# Patient Record
Sex: FEMALE | Race: AMERICAN INDIAN OR ALASKA NATIVE | URBAN - NONMETROPOLITAN AREA
[De-identification: names, ages, dates, MRNs, and addresses within clinical notes are randomized per-mention and may not be internally consistent; named-entity substitution may affect disease eponyms.]

---

## 2017-02-07 ENCOUNTER — NEW PATIENT (OUTPATIENT)
Dept: URBAN - NONMETROPOLITAN AREA CLINIC 6 | Facility: CLINIC | Age: 82
End: 2017-02-07
Payer: MEDICARE

## 2017-02-07 DIAGNOSIS — H25.12 AGE-RELATED NUCLEAR CATARACT, LEFT EYE: Primary | ICD-10-CM

## 2017-02-07 PROCEDURE — 92004 COMPRE OPH EXAM NEW PT 1/>: CPT | Performed by: OPTOMETRIST

## 2017-02-07 ASSESSMENT — INTRAOCULAR PRESSURE
OD: 14
OS: 14

## 2017-02-07 ASSESSMENT — VISUAL ACUITY
OD: 20/30
OS: 20/70

## 2017-02-07 ASSESSMENT — KERATOMETRY
OS: 40.66
OD: 41.31

## 2017-02-27 ENCOUNTER — Encounter (OUTPATIENT)
Dept: URBAN - NONMETROPOLITAN AREA CLINIC 6 | Facility: CLINIC | Age: 82
End: 2017-02-27
Payer: MEDICARE

## 2017-02-27 DIAGNOSIS — H25.012 CORTICAL AGE-RELATED CATARACT, LEFT EYE: Primary | ICD-10-CM

## 2017-02-27 DIAGNOSIS — Z01.818 ENCOUNTER FOR OTHER PREPROCEDURAL EXAMINATION: Primary | ICD-10-CM

## 2017-02-27 PROCEDURE — 99213 OFFICE O/P EST LOW 20 MIN: CPT | Performed by: PHYSICIAN ASSISTANT

## 2017-02-27 RX ORDER — PREDNISOLONE ACETATE 10 MG/ML
1 % SUSPENSION/ DROPS OPHTHALMIC
Qty: 1 | Refills: 1 | Status: INACTIVE
Start: 2017-02-27 | End: 2017-10-05

## 2017-02-27 RX ORDER — OFLOXACIN 3 MG/ML
0.3 % SOLUTION/ DROPS OPHTHALMIC
Qty: 1 | Refills: 1 | Status: INACTIVE
Start: 2017-02-27 | End: 2017-10-05

## 2017-02-27 RX ORDER — NAPROXEN 500 MG/1
500 MG TABLET ORAL
Qty: 0 | Refills: 0 | Status: INACTIVE
Start: 2017-02-27 | End: 2021-11-10

## 2017-02-27 RX ORDER — NEPAFENAC 1 MG/ML
0.1 % SUSPENSION/ DROPS OPHTHALMIC
Qty: 1 | Refills: 1 | Status: INACTIVE
Start: 2017-02-27 | End: 2017-10-05

## 2017-03-09 ENCOUNTER — NEW PATIENT (OUTPATIENT)
Dept: URBAN - NONMETROPOLITAN AREA CLINIC 6 | Facility: CLINIC | Age: 82
End: 2017-03-09
Payer: MEDICARE

## 2017-03-09 ENCOUNTER — SURGERY (OUTPATIENT)
Dept: URBAN - NONMETROPOLITAN AREA SURGERY 9 | Facility: SURGERY | Age: 82
End: 2017-03-09
Payer: MEDICARE

## 2017-03-09 DIAGNOSIS — H26.491 OTHER SECONDARY CATARACT, RIGHT EYE: ICD-10-CM

## 2017-03-09 DIAGNOSIS — H25.13 AGE-RELATED NUCLEAR CATARACT, BILATERAL: Primary | ICD-10-CM

## 2017-03-09 DIAGNOSIS — H35.3111 NONEXUDATIVE MACULAR DEGENERATION, EARLY DRY STAGE, RIGHT EYE: ICD-10-CM

## 2017-03-09 PROCEDURE — 66984 XCAPSL CTRC RMVL W/O ECP: CPT | Performed by: OPHTHALMOLOGY

## 2017-03-09 PROCEDURE — 92004 COMPRE OPH EXAM NEW PT 1/>: CPT | Performed by: OPHTHALMOLOGY

## 2017-03-09 ASSESSMENT — VISUAL ACUITY
OD: 20/30
OS: 20/70

## 2017-03-09 ASSESSMENT — KERATOMETRY
OS: 40.66
OD: 41.31

## 2017-03-09 ASSESSMENT — INTRAOCULAR PRESSURE
OD: 19
OS: 18
OS: 18
OD: 19

## 2017-03-10 ENCOUNTER — POST OP (OUTPATIENT)
Dept: URBAN - NONMETROPOLITAN AREA CLINIC 6 | Facility: CLINIC | Age: 82
End: 2017-03-10

## 2017-03-10 DIAGNOSIS — Z96.1 PRESENCE OF INTRAOCULAR LENS: Primary | ICD-10-CM

## 2017-03-10 PROCEDURE — 99024 POSTOP FOLLOW-UP VISIT: CPT | Performed by: OPTOMETRIST

## 2017-03-10 ASSESSMENT — INTRAOCULAR PRESSURE: OS: 14

## 2017-03-17 ENCOUNTER — POST OP (OUTPATIENT)
Dept: URBAN - NONMETROPOLITAN AREA CLINIC 3 | Facility: CLINIC | Age: 82
End: 2017-03-17

## 2017-03-17 PROCEDURE — 99024 POSTOP FOLLOW-UP VISIT: CPT | Performed by: OPTOMETRIST

## 2017-03-17 ASSESSMENT — INTRAOCULAR PRESSURE
OS: 19
OD: 15

## 2017-03-17 ASSESSMENT — VISUAL ACUITY: OS: 20/40

## 2017-04-21 ENCOUNTER — POST OP (OUTPATIENT)
Dept: URBAN - NONMETROPOLITAN AREA CLINIC 3 | Facility: CLINIC | Age: 82
End: 2017-04-21

## 2017-04-21 PROCEDURE — 99024 POSTOP FOLLOW-UP VISIT: CPT | Performed by: OPTOMETRIST

## 2017-04-21 ASSESSMENT — VISUAL ACUITY
OD: 20/30
OS: 20/50

## 2017-04-21 ASSESSMENT — INTRAOCULAR PRESSURE: OS: 22

## 2017-10-05 ENCOUNTER — FOLLOW UP ESTABLISHED (OUTPATIENT)
Dept: URBAN - NONMETROPOLITAN AREA CLINIC 3 | Facility: CLINIC | Age: 82
End: 2017-10-05
Payer: MEDICARE

## 2017-10-05 DIAGNOSIS — H35.3131 NONEXUDATIVE MACULAR DEGENERATION, EARLY DRY STAGE, BILATERAL: Primary | ICD-10-CM

## 2017-10-05 PROCEDURE — 92014 COMPRE OPH EXAM EST PT 1/>: CPT | Performed by: OPTOMETRIST

## 2017-10-05 PROCEDURE — 92134 CPTRZ OPH DX IMG PST SGM RTA: CPT | Performed by: OPTOMETRIST

## 2017-10-05 ASSESSMENT — INTRAOCULAR PRESSURE
OS: 14
OD: 12

## 2017-10-05 ASSESSMENT — VISUAL ACUITY
OD: 20/30
OS: 20/25

## 2017-10-05 ASSESSMENT — KERATOMETRY
OS: 41.88
OD: 41.75

## 2018-07-17 ENCOUNTER — FOLLOW UP ESTABLISHED (OUTPATIENT)
Dept: URBAN - NONMETROPOLITAN AREA CLINIC 3 | Facility: CLINIC | Age: 83
End: 2018-07-17
Payer: MEDICARE

## 2018-07-17 PROCEDURE — 92014 COMPRE OPH EXAM EST PT 1/>: CPT | Performed by: OPTOMETRIST

## 2018-07-17 PROCEDURE — 92134 CPTRZ OPH DX IMG PST SGM RTA: CPT | Performed by: OPTOMETRIST

## 2018-07-17 ASSESSMENT — INTRAOCULAR PRESSURE
OD: 15
OS: 11

## 2018-07-17 ASSESSMENT — KERATOMETRY
OD: 41.88
OS: 40.75

## 2018-07-17 ASSESSMENT — VISUAL ACUITY
OD: 20/40
OS: 20/30

## 2021-09-07 ENCOUNTER — OFFICE VISIT (OUTPATIENT)
Dept: URBAN - NONMETROPOLITAN AREA CLINIC 3 | Facility: CLINIC | Age: 86
End: 2021-09-07
Payer: MEDICARE

## 2021-09-07 DIAGNOSIS — S05.01XA CORNEAL ABRASION W/O FB OF RIGHT EYE, INITIAL ENCOUNTER: ICD-10-CM

## 2021-09-07 PROCEDURE — 99203 OFFICE O/P NEW LOW 30 MIN: CPT | Performed by: OPTOMETRIST

## 2021-09-07 NOTE — IMPRESSION/PLAN
Impression: Corneal abrasion w/o FB of right eye, initial encounter: S05.01xA. Plan: Discussed diagnosis in detail with patient. Discussed treatment options with patient. Will continue to observe condition and or symptoms.

## 2021-09-14 ENCOUNTER — OFFICE VISIT (OUTPATIENT)
Dept: URBAN - NONMETROPOLITAN AREA CLINIC 3 | Facility: CLINIC | Age: 86
End: 2021-09-14
Payer: MEDICARE

## 2021-09-14 DIAGNOSIS — S05.01XD: ICD-10-CM

## 2021-09-14 DIAGNOSIS — H02.009 ENTROPION OF EYELID: Primary | ICD-10-CM

## 2021-09-14 PROCEDURE — 99213 OFFICE O/P EST LOW 20 MIN: CPT | Performed by: OPTOMETRIST

## 2021-09-14 NOTE — IMPRESSION/PLAN
Impression: Entropion of eyelid: H02.009. Plan: OD>OS  Discussed diagnosis in detail with patient. Discussed treatment options with patient. Discussed risks and benefits and patient understands. Advised patient to keep appointment with Dr. Radha Chaudhari as scheduled. Trial bandage CL OD. Advised to keep BCL until appointment with Dr. Radha Chaudhari.

## 2021-09-14 NOTE — IMPRESSION/PLAN
Impression: Corneal abrasion w/o foreign body of right eye, subsequent encounter: S05.01xD. Plan: RESOLVED. Discussed diagnosis in detail with patient. Discussed treatment options with patient. Will continue to observe condition and or symptoms.

## 2021-09-21 ENCOUNTER — OFFICE VISIT (OUTPATIENT)
Dept: URBAN - METROPOLITAN AREA CLINIC 26 | Facility: CLINIC | Age: 86
End: 2021-09-21
Payer: MEDICARE

## 2021-09-21 PROCEDURE — 99204 OFFICE O/P NEW MOD 45 MIN: CPT | Performed by: OPHTHALMOLOGY

## 2021-09-21 PROCEDURE — 92285 EXTERNAL OCULAR PHOTOGRAPHY: CPT | Performed by: OPHTHALMOLOGY

## 2021-09-21 RX ORDER — NEOMYCIN SULFATE, POLYMYXIN B SULFATE AND DEXAMETHASONE 3.5; 10000; 1 MG/G; [USP'U]/G; MG/G
OINTMENT OPHTHALMIC
Qty: 2 | Refills: 1 | Status: ACTIVE
Start: 2021-09-21

## 2021-09-21 NOTE — IMPRESSION/PLAN
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 Plan: The patient demonstrates dave tarsal entropion, with lashes abrading the cornea/globe, creating a mechanical keratoconjunctivitis. To resolve this, I recommended ATR < 10cm , along with a lateral tarsal strip to elevate/tighten/stabilize the lower lid, and prevent recurrence of entropion. The RBA of surgery were discussed and all questions were answered.

## 2021-10-06 ENCOUNTER — OFFICE VISIT (OUTPATIENT)
Dept: URBAN - NONMETROPOLITAN AREA CLINIC 3 | Facility: CLINIC | Age: 86
End: 2021-10-06
Payer: MEDICARE

## 2021-10-06 PROCEDURE — 99212 OFFICE O/P EST SF 10 MIN: CPT | Performed by: OPTOMETRIST

## 2021-10-06 NOTE — IMPRESSION/PLAN
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 Plan: BCL NOT present. Advised to keep appointment with Dr. Army Bill.

## 2021-11-10 ENCOUNTER — ADULT PHYSICAL (OUTPATIENT)
Dept: URBAN - METROPOLITAN AREA CLINIC 26 | Facility: CLINIC | Age: 86
End: 2021-11-10
Payer: MEDICARE

## 2021-11-10 DIAGNOSIS — H02.032 SENILE ENTROPION OF RIGHT LOWER EYELID: ICD-10-CM

## 2021-11-10 PROCEDURE — 99203 OFFICE O/P NEW LOW 30 MIN: CPT | Performed by: PHYSICIAN ASSISTANT

## 2022-08-09 ENCOUNTER — OFFICE VISIT (OUTPATIENT)
Dept: URBAN - NONMETROPOLITAN AREA CLINIC 3 | Facility: CLINIC | Age: 87
End: 2022-08-09
Payer: MEDICARE

## 2022-08-09 DIAGNOSIS — H26.493 OTHER SECONDARY CATARACT, BILATERAL: ICD-10-CM

## 2022-08-09 DIAGNOSIS — S05.02XA CORNEAL ABRASION W/O FB OF LEFT EYE, INITIAL ENCOUNTER: ICD-10-CM

## 2022-08-09 DIAGNOSIS — H02.035 SENILE ENTROPION OF LEFT LOWER EYELID: ICD-10-CM

## 2022-08-09 DIAGNOSIS — H02.032 SENILE ENTROPION OF RIGHT LOWER EYELID: Primary | ICD-10-CM

## 2022-08-09 DIAGNOSIS — H40.021 OPEN ANGLE WITH BORDERLINE FINDINGS, HIGH RISK, RIGHT EYE: ICD-10-CM

## 2022-08-09 PROCEDURE — 99214 OFFICE O/P EST MOD 30 MIN: CPT | Performed by: STUDENT IN AN ORGANIZED HEALTH CARE EDUCATION/TRAINING PROGRAM

## 2022-08-09 RX ORDER — ERYTHROMYCIN 5 MG/G
OINTMENT OPHTHALMIC
Qty: 3.5 | Refills: 1 | Status: ACTIVE
Start: 2022-08-09

## 2022-08-09 ASSESSMENT — INTRAOCULAR PRESSURE
OD: 14
OS: 14

## 2022-08-09 NOTE — IMPRESSION/PLAN
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 Plan: Patient ed on findings. Recommend consult with Dr. Yudith Rodrigez. Patient previously cleared for surgery but was unable to attend d/t illness.

## 2022-08-09 NOTE — IMPRESSION/PLAN
Impression: Open angle with borderline findings, high risk, right eye: H40.021.
-Drance heme OD inferior Plan: Patient educated on findings. Educated on need for additional testing. Discussed with patient glaucoma suspect status, large C/D, and glaucoma disease process. Patient educated extensively on importance of a dilated examination and aware of risks of being unable to evaluate for retinal tears, holes, hemorrhages, detachments, glaucoma, macular degeneration or other peripheral pathology. Dilation allows the physician to evaluate the nerve and macula stereoscopically as well as the peripheral retina. Patient understands that Dr. Meredith Morales and/or [facility] cannot be held liable for pathology missed due to patient's decision AMA not to be dilated. Patient aware of risks and accepts them.

## 2022-08-09 NOTE — IMPRESSION/PLAN
Impression: Corneal abrasion w/o FB of left eye, initial encounter: S05. 02XA. Plan: Pt ed on findings. Rx e'mycin onofre BID OU until seen next.

## 2022-09-07 ENCOUNTER — OFFICE VISIT (OUTPATIENT)
Dept: URBAN - NONMETROPOLITAN AREA CLINIC 3 | Facility: CLINIC | Age: 87
End: 2022-09-07
Payer: MEDICARE

## 2022-09-07 DIAGNOSIS — H40.021 OPEN ANGLE WITH BORDERLINE FINDINGS, HIGH RISK, RIGHT EYE: ICD-10-CM

## 2022-09-07 DIAGNOSIS — H02.032 SENILE ENTROPION OF RIGHT LOWER EYELID: Primary | ICD-10-CM

## 2022-09-07 DIAGNOSIS — H26.493 OTHER SECONDARY CATARACT, BILATERAL: ICD-10-CM

## 2022-09-07 DIAGNOSIS — H52.4 PRESBYOPIA: ICD-10-CM

## 2022-09-07 DIAGNOSIS — H02.035 SENILE ENTROPION OF LEFT LOWER EYELID: ICD-10-CM

## 2022-09-07 PROCEDURE — 99214 OFFICE O/P EST MOD 30 MIN: CPT | Performed by: STUDENT IN AN ORGANIZED HEALTH CARE EDUCATION/TRAINING PROGRAM

## 2022-09-07 PROCEDURE — 92133 CPTRZD OPH DX IMG PST SGM ON: CPT | Performed by: STUDENT IN AN ORGANIZED HEALTH CARE EDUCATION/TRAINING PROGRAM

## 2022-09-07 ASSESSMENT — VISUAL ACUITY
OS: 20/50
OD: 20/50

## 2022-09-07 ASSESSMENT — INTRAOCULAR PRESSURE
OD: 21
OS: 18

## 2022-09-07 NOTE — IMPRESSION/PLAN
Impression: Open angle with borderline findings, high risk, right eye: H40.021.
-Jonelle stout OD inferior Plan: Patient educated on findings. Educated on need for additional testing. Discussed with patient glaucoma suspect status, large C/D, and glaucoma disease process. Jonelle stout resolved. No tx started today. RNFL poor scan, but appears robust nerve fiber.

## 2022-09-07 NOTE — IMPRESSION/PLAN
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 Plan: Patient ed on findings. Keep appt with Dr. Armida Spencer. Cornea improved today.

## 2022-12-15 ENCOUNTER — OFFICE VISIT (OUTPATIENT)
Dept: URBAN - METROPOLITAN AREA CLINIC 24 | Facility: CLINIC | Age: 87
End: 2022-12-15
Payer: MEDICARE

## 2022-12-15 DIAGNOSIS — H02.032 SENILE ENTROPION OF RIGHT LOWER EYELID: Primary | ICD-10-CM

## 2022-12-15 DIAGNOSIS — H02.035 SENILE ENTROPION OF LEFT LOWER EYELID: ICD-10-CM

## 2022-12-15 PROCEDURE — 99213 OFFICE O/P EST LOW 20 MIN: CPT | Performed by: OPHTHALMOLOGY

## 2022-12-15 PROCEDURE — 92285 EXTERNAL OCULAR PHOTOGRAPHY: CPT | Performed by: OPHTHALMOLOGY

## 2022-12-15 RX ORDER — NEOMYCIN SULFATE, POLYMYXIN B SULFATE AND DEXAMETHASONE 3.5; 10000; 1 MG/G; [USP'U]/G; MG/G
OINTMENT OPHTHALMIC
Qty: 2 | Refills: 1 | Status: ACTIVE
Start: 2022-12-15

## 2022-12-15 NOTE — IMPRESSION/PLAN
Impression: Senile entropion of left lower eyelid: H02.035. Plan: The patient demonstrates dave tarsal entropion, with lashes abrading the cornea/globe, creating a mechanical keratoconjunctivitis. To resolve this, I recommended ATR < 10cm , along with a lateral tarsal strip to elevate/tighten/stabilize the lower lid, and prevent recurrence of entropion. The RBA of surgery were discussed and all questions were answered.

## 2023-02-23 ENCOUNTER — ADULT PHYSICAL (OUTPATIENT)
Dept: URBAN - METROPOLITAN AREA CLINIC 24 | Facility: CLINIC | Age: 88
End: 2023-02-23
Payer: MEDICARE

## 2023-02-23 DIAGNOSIS — H02.032 SENILE ENTROPION OF RIGHT LOWER EYELID: ICD-10-CM

## 2023-02-23 DIAGNOSIS — Z01.818 ENCOUNTER FOR OTHER PREPROCEDURAL EXAMINATION: Primary | ICD-10-CM

## 2023-02-23 DIAGNOSIS — H02.035 SENILE ENTROPION OF LEFT LOWER EYELID: ICD-10-CM

## 2023-02-23 PROCEDURE — 99213 OFFICE O/P EST LOW 20 MIN: CPT | Performed by: PHYSICIAN ASSISTANT

## 2023-02-23 ASSESSMENT — INTRAOCULAR PRESSURE
OS: 18
OD: 39
OD: 29

## 2023-03-07 ENCOUNTER — OFFICE VISIT (OUTPATIENT)
Dept: URBAN - NONMETROPOLITAN AREA CLINIC 3 | Facility: CLINIC | Age: 88
End: 2023-03-07
Payer: MEDICARE

## 2023-03-07 DIAGNOSIS — H34.8310 TRIBUTARY BRANCH RETINAL VEIN OCCLUSION OF RIGHT EYE W/ MACULAR EDEMA: Primary | ICD-10-CM

## 2023-03-07 PROCEDURE — 99212 OFFICE O/P EST SF 10 MIN: CPT | Performed by: OPTOMETRIST

## 2023-03-07 ASSESSMENT — INTRAOCULAR PRESSURE
OD: 38
OS: 16

## 2023-03-07 NOTE — IMPRESSION/PLAN
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 Plan: IOP elevated @32 (dilated), *PT reports NONcompliance  to  drops
possible  elevated IOP leading to hemiretinal vein occlusion. Pt ed likely cause of reduced vision and concerns for other vascular changes but per pt [facility] testing with normal labs and MRI.  Recommend f/u with PCP at [facility] in next week to further re-eval general health.

keep retinal consult  as  scheduled in [facility]

## 2023-03-29 ENCOUNTER — OFFICE VISIT (OUTPATIENT)
Dept: URBAN - METROPOLITAN AREA CLINIC 7 | Facility: CLINIC | Age: 88
End: 2023-03-29
Payer: MEDICARE

## 2023-03-29 DIAGNOSIS — H40.1133 PRIMARY OPEN-ANGLE GLAUCOMA, BILATERAL, SEVERE STAGE: ICD-10-CM

## 2023-03-29 DIAGNOSIS — H40.9 GLAUCOMA: ICD-10-CM

## 2023-03-29 DIAGNOSIS — H34.8310 TRIBUTARY (BRANCH) RETINAL VEIN OCCLUSION, RIGHT EYE, WITH MACULAR EDEMA: Primary | ICD-10-CM

## 2023-03-29 PROCEDURE — 65800 DRAINAGE OF EYE: CPT | Performed by: OPHTHALMOLOGY

## 2023-03-29 PROCEDURE — 67028 INJECTION EYE DRUG: CPT | Performed by: OPHTHALMOLOGY

## 2023-03-29 PROCEDURE — 99204 OFFICE O/P NEW MOD 45 MIN: CPT | Performed by: OPHTHALMOLOGY

## 2023-03-29 PROCEDURE — 92134 CPTRZ OPH DX IMG PST SGM RTA: CPT | Performed by: OPHTHALMOLOGY

## 2023-03-29 ASSESSMENT — INTRAOCULAR PRESSURE
OD: 44
OS: 14

## 2023-03-29 NOTE — IMPRESSION/PLAN
Impression: Glaucoma: H40.9.
OD>>OS Plan: On Brimonidine, Cosopt BID OD. Pt being followed by [facility] for Glaucoma. Limited visual prognosis OD due to advanced glaucoma. Rec pt continue to follow up in [address] [place] [address]. I would also recommend pt see Glaucoma for evaluation for possible tube vs diode given severely elevated IOP.

## 2023-03-29 NOTE — IMPRESSION/PLAN
Impression: Tributary (branch) retinal vein occlusion, right eye, with macular edema: O81.7831.
-s/p micropulse laser with Dr Hoda Fleming (3/15/23) OCT: 3/29/23 OD: ME
OS: wnl Plan: The diagnosis, natural history, and prognosis of retinal vein occlusion, were discussed. The associations with macular edema and neovascularization were also discussed. Currently, there is no iris or angle neovascularization identified on examination, and the IOP is within normal limits. Risks, benefits, and alternatives of treatment options including laser, steroids, Eylea, Lucentis and Avastin, as well as the opportunity to participate in a clinical trial were discussed at length. The patient understands that treatment may not improve vision, but should reduce the risk of further visual loss. Given worsening of vision and exam, pt elects to proceed with Avastin OD (with AC tap) Drop of Brimonidine given after injection RTC 4 weeks DFE OU OCT OU RE-eval Avastin

## 2023-04-19 ENCOUNTER — OFFICE VISIT (OUTPATIENT)
Dept: URBAN - METROPOLITAN AREA CLINIC 24 | Facility: CLINIC | Age: 88
End: 2023-04-19
Payer: MEDICARE

## 2023-04-19 DIAGNOSIS — H34.8310 TRIBUTARY (BRANCH) RETINAL VEIN OCCLUSION, RIGHT EYE, WITH MACULAR EDEMA: Primary | ICD-10-CM

## 2023-04-19 DIAGNOSIS — H40.1121 PRIMARY OPEN-ANGLE GLAUCOMA, MILD STAGE, LEFT EYE: ICD-10-CM

## 2023-04-19 DIAGNOSIS — H40.1113 PRIMARY OPEN-ANGLE GLAUCOMA, SEVERE STAGE, RIGHT EYE: ICD-10-CM

## 2023-04-19 DIAGNOSIS — H40.1133 PRIMARY OPEN-ANGLE GLAUCOMA, BILATERAL, SEVERE STAGE: ICD-10-CM

## 2023-04-19 DIAGNOSIS — H02.009 ENTROPION OF EYELID: ICD-10-CM

## 2023-04-19 PROCEDURE — 99214 OFFICE O/P EST MOD 30 MIN: CPT | Performed by: OPHTHALMOLOGY

## 2023-04-19 PROCEDURE — 92133 CPTRZD OPH DX IMG PST SGM ON: CPT | Performed by: OPHTHALMOLOGY

## 2023-04-19 ASSESSMENT — INTRAOCULAR PRESSURE
OS: 13
OD: 44

## 2023-04-19 NOTE — IMPRESSION/PLAN
Impression: Primary open-angle glaucoma, severe stage, right eye: H40.1113.
s/p BRVO OD Plan: Pt has Glaucoma    Pachs:  559/567   Today's IOP :  44/13       Tmax  :  44/22 Target IOP comfort care OD // low teens OS Pt denies Fhx of Glaucoma Left eye is the only seeing eye HVF 
C/D:  0.95 OD // 0.7 OS with thin S rim OCT: 77/76 (4/19/23) Pt denies Sulfa Allergy   // Pt denies Lung /Heart dx Plan :
1. Continue Brimonidine BID OD Cosopt BID OD Discussed details about Glaucoma and that without proper control of pressures irreversible blindness can occur. Patient understands risks. Emphasize compliance with drop and without compliance vision loss progression can occur. 2. I do not recommend surgical intervention OD due to LP vision (pt agrees to comfort care only) and lack of pain 3. OS is stable without treatment but should be monitored with DR. Brisa Renteria 4.  Return to Dr. Brisa Renteria in 3 months

## 2023-04-19 NOTE — IMPRESSION/PLAN
Impression: Entropion of eyelid: H02.009. Plan: bilateral lower eyelid entropion Pt denies any discomfort OU - offered plastics consult

## 2023-04-19 NOTE — IMPRESSION/PLAN
Impression: Tributary (branch) retinal vein occlusion, right eye, with macular edema: A40.3807.
-s/p micropulse laser with Dr Travis Ascencio (3/15/23) OCT: 3/29/23 OD: ME
OS: wnl Plan: Monitored by retina Ensure good BP and Cholesterol control

## 2023-05-16 ENCOUNTER — OFFICE VISIT (OUTPATIENT)
Dept: URBAN - METROPOLITAN AREA CLINIC 13 | Facility: CLINIC | Age: 88
End: 2023-05-16
Payer: MEDICARE

## 2023-05-16 DIAGNOSIS — H40.1113 PRIMARY OPEN-ANGLE GLAUCOMA, SEVERE STAGE, RIGHT EYE: ICD-10-CM

## 2023-05-16 DIAGNOSIS — H34.8310 TRIBUTARY (BRANCH) RETINAL VEIN OCCLUSION, RIGHT EYE, WITH MACULAR EDEMA: Primary | ICD-10-CM

## 2023-05-16 DIAGNOSIS — H40.9 GLAUCOMA: ICD-10-CM

## 2023-05-16 PROCEDURE — 92134 CPTRZ OPH DX IMG PST SGM RTA: CPT | Performed by: OPHTHALMOLOGY

## 2023-05-16 PROCEDURE — 99213 OFFICE O/P EST LOW 20 MIN: CPT | Performed by: OPHTHALMOLOGY

## 2023-05-16 ASSESSMENT — INTRAOCULAR PRESSURE
OS: 16
OD: 34

## 2023-05-16 NOTE — IMPRESSION/PLAN
Impression: Glaucoma: H40.9.
OD>>OS Plan: On Brimonidine, Cosopt BID OD. Pt being followed by [facility] for Glaucoma. Limited visual prognosis OD due to advanced glaucoma. Rec pt continue to follow up in [address] [facility]. Pt saw Dr Tahira Cheng- no surgical intervention recommended OD.

## 2023-05-16 NOTE — IMPRESSION/PLAN
Impression: Tributary (branch) retinal vein occlusion, right eye, with macular edema: N78.2477.
-s/p micropulse laser with Dr Shay Bustos (3/15/23) -s/p Avastin 3/29/23 OCT: 5/16/2023 OD: ME- resolved OS: wnl Plan: The diagnosis, natural history, and prognosis of retinal vein occlusion, were discussed. The associations with macular edema and neovascularization were also discussed. Currently, there is no iris or angle neovascularization identified on examination, and the IOP is within normal limits. Risks, benefits, and alternatives of treatment options including laser, steroids, Eylea, Lucentis and Avastin, as well as the opportunity to participate in a clinical trial were discussed at length. The patient understands that treatment may not improve vision, but should reduce the risk of further visual loss. Given stability of vision and exam, pt elects to proceed with observation. VA is ultimately limited by Glaucoma RTC 3 months DFE OU OCT OU RE-eval Avastin

## 2023-07-26 ENCOUNTER — OFFICE VISIT (OUTPATIENT)
Dept: URBAN - NONMETROPOLITAN AREA CLINIC 3 | Facility: CLINIC | Age: 88
End: 2023-07-26
Payer: MEDICARE

## 2023-07-26 DIAGNOSIS — H02.009 ENTROPION OF EYELID: ICD-10-CM

## 2023-07-26 DIAGNOSIS — H40.1113 PRIMARY OPEN-ANGLE GLAUCOMA, SEVERE STAGE, RIGHT EYE: Primary | ICD-10-CM

## 2023-07-26 PROCEDURE — 99213 OFFICE O/P EST LOW 20 MIN: CPT | Performed by: STUDENT IN AN ORGANIZED HEALTH CARE EDUCATION/TRAINING PROGRAM

## 2023-07-26 ASSESSMENT — INTRAOCULAR PRESSURE
OS: 15
OD: 35

## 2023-09-28 ENCOUNTER — OFFICE VISIT (OUTPATIENT)
Dept: URBAN - METROPOLITAN AREA CLINIC 41 | Facility: CLINIC | Age: 88
End: 2023-09-28
Payer: MEDICARE

## 2023-09-28 DIAGNOSIS — H40.9 GLAUCOMA: ICD-10-CM

## 2023-09-28 DIAGNOSIS — H34.8310 TRIBUTARY (BRANCH) RETINAL VEIN OCCLUSION, RIGHT EYE, WITH MACULAR EDEMA: Primary | ICD-10-CM

## 2023-09-28 PROCEDURE — 92014 COMPRE OPH EXAM EST PT 1/>: CPT | Performed by: OPHTHALMOLOGY

## 2023-09-28 PROCEDURE — 92134 CPTRZ OPH DX IMG PST SGM RTA: CPT | Performed by: OPHTHALMOLOGY

## 2023-09-28 ASSESSMENT — INTRAOCULAR PRESSURE
OS: 12
OD: 40

## 2024-03-06 ENCOUNTER — OFFICE VISIT (OUTPATIENT)
Dept: URBAN - METROPOLITAN AREA CLINIC 41 | Facility: CLINIC | Age: 89
End: 2024-03-06
Payer: MEDICARE

## 2024-03-06 DIAGNOSIS — H34.8310 TRIBUTARY (BRANCH) RETINAL VEIN OCCLUSION, RIGHT EYE, WITH MACULAR EDEMA: Primary | ICD-10-CM

## 2024-03-06 DIAGNOSIS — Z96.1 PRESENCE OF INTRAOCULAR LENS: ICD-10-CM

## 2024-03-06 DIAGNOSIS — H40.9 GLAUCOMA: ICD-10-CM

## 2024-03-06 PROCEDURE — 99214 OFFICE O/P EST MOD 30 MIN: CPT | Performed by: OPHTHALMOLOGY

## 2024-03-06 PROCEDURE — 92134 CPTRZ OPH DX IMG PST SGM RTA: CPT | Performed by: OPHTHALMOLOGY

## 2024-03-06 ASSESSMENT — INTRAOCULAR PRESSURE
OD: 47
OS: 14